# Patient Record
Sex: FEMALE | Race: BLACK OR AFRICAN AMERICAN | NOT HISPANIC OR LATINO | URBAN - METROPOLITAN AREA
[De-identification: names, ages, dates, MRNs, and addresses within clinical notes are randomized per-mention and may not be internally consistent; named-entity substitution may affect disease eponyms.]

---

## 2022-12-07 ENCOUNTER — TELEPHONE ENCOUNTER (OUTPATIENT)
Dept: URBAN - METROPOLITAN AREA CLINIC 113 | Facility: CLINIC | Age: 42
End: 2022-12-07

## 2022-12-28 ENCOUNTER — OFFICE VISIT (OUTPATIENT)
Dept: URBAN - METROPOLITAN AREA CLINIC 113 | Facility: CLINIC | Age: 42
End: 2022-12-28
Payer: COMMERCIAL

## 2022-12-28 VITALS
RESPIRATION RATE: 18 BRPM | SYSTOLIC BLOOD PRESSURE: 136 MMHG | BODY MASS INDEX: 31.83 KG/M2 | WEIGHT: 173 LBS | TEMPERATURE: 97.3 F | HEIGHT: 62 IN | HEART RATE: 77 BPM | DIASTOLIC BLOOD PRESSURE: 86 MMHG

## 2022-12-28 DIAGNOSIS — K21.9 GASTROESOPHAGEAL REFLUX DISEASE WITHOUT ESOPHAGITIS: ICD-10-CM

## 2022-12-28 DIAGNOSIS — K59.04 CHRONIC IDIOPATHIC CONSTIPATION: ICD-10-CM

## 2022-12-28 PROCEDURE — 99204 OFFICE O/P NEW MOD 45 MIN: CPT | Performed by: NURSE PRACTITIONER

## 2022-12-28 RX ORDER — ROSUVASTATIN CALCIUM 5 MG/1
1 TABLET TABLET, FILM COATED ORAL ONCE A DAY
Status: ACTIVE | COMMUNITY

## 2022-12-28 RX ORDER — METFORMIN HYDROCHLORIDE 500 MG/1
1 TABLET TABLET, FILM COATED ORAL TWICE DAILY
Status: ACTIVE | COMMUNITY

## 2022-12-28 RX ORDER — MULTIVITAMIN WITH IRON
AS DIRECTED TABLET ORAL
Status: ACTIVE | COMMUNITY

## 2022-12-28 RX ORDER — OMEPRAZOLE 40 MG/1
1 CAPSULE 30 MINUTES BEFORE MORNING MEAL CAPSULE, DELAYED RELEASE ORAL ONCE A DAY
Status: ACTIVE | COMMUNITY

## 2022-12-28 RX ORDER — OMEPRAZOLE 40 MG/1
1 CAPSULE 30 MINUTES BEFORE MORNING MEAL CAPSULE, DELAYED RELEASE ORAL ONCE A DAY
OUTPATIENT

## 2022-12-28 RX ORDER — LINACLOTIDE 72 UG/1
1 CAPSULE AT LEAST 30 MINUTES BEFORE THE FIRST MEAL OF THE DAY ON AN EMPTY STOMACH CAPSULE, GELATIN COATED ORAL ONCE A DAY
Qty: 90 | Refills: 3 | OUTPATIENT

## 2022-12-28 NOTE — HPI-TODAY'S VISIT:
42-year-old female referred by Dr. Deedee Luque as an urgent consult for chronic idiopathic constipation.  A copy of today's visit will be forwarded to the referring provider.  Referral notes are reviewed.  She has a history of chronic GERD and constipation despite treatment with pantoprazole 40 mg daily and Linzess. She reports a past medical history of type 2 diabetes telling me her most recent A1c was 6.8.  She also reports a history of hyperlipidemia for which she is taking Crestor.  She describes 2 prior surgeries required to remove ovarian cysts.  She denies any family history of liver disease, pancreas disease, colon cancer, stomach cancer.  She does report a maternal grandmother with polyps at the age of 80.  She denies any alcohol, tobacco or drug use.  She does use NSAIDs on rare occasion for headaches, less than 2 times per month.  She is not having any difficulty with swallowing.  She has heartburn that is typically diet dependent.  Seasonings and spaghetti sauce are typical triggers.  This has been ongoing for several years.  She reports good control with omeprazole 40 mg daily.  There is no nausea or vomiting.  She describes generalized abdominal pain and bloating associated with need for bowel movement.  There can sometimes be increased pain during a bowel movement.  She is having bowel movements once per week.  She was recently prescribed Linzess 72 mcg which she has taken a total of 3 times.  This was helpful, and resulted in 3 bowel movements in a day.

## 2023-01-27 ENCOUNTER — WEB ENCOUNTER (OUTPATIENT)
Dept: URBAN - METROPOLITAN AREA CLINIC 113 | Facility: CLINIC | Age: 43
End: 2023-01-27

## 2023-01-27 ENCOUNTER — OFFICE VISIT (OUTPATIENT)
Dept: URBAN - METROPOLITAN AREA CLINIC 113 | Facility: CLINIC | Age: 43
End: 2023-01-27
Payer: COMMERCIAL

## 2023-01-27 VITALS
SYSTOLIC BLOOD PRESSURE: 155 MMHG | RESPIRATION RATE: 20 BRPM | HEIGHT: 62 IN | TEMPERATURE: 97.5 F | HEART RATE: 76 BPM | WEIGHT: 177.6 LBS | BODY MASS INDEX: 32.68 KG/M2 | DIASTOLIC BLOOD PRESSURE: 98 MMHG

## 2023-01-27 DIAGNOSIS — K59.04 CHRONIC IDIOPATHIC CONSTIPATION: ICD-10-CM

## 2023-01-27 DIAGNOSIS — K21.9 GASTROESOPHAGEAL REFLUX DISEASE WITHOUT ESOPHAGITIS: ICD-10-CM

## 2023-01-27 PROBLEM — 82934008: Status: ACTIVE | Noted: 2022-12-28

## 2023-01-27 PROBLEM — 266435005: Status: ACTIVE | Noted: 2022-12-28

## 2023-01-27 PROCEDURE — 99213 OFFICE O/P EST LOW 20 MIN: CPT | Performed by: NURSE PRACTITIONER

## 2023-01-27 RX ORDER — MULTIVITAMIN WITH IRON
AS DIRECTED TABLET ORAL
Status: ACTIVE | COMMUNITY

## 2023-01-27 RX ORDER — LINACLOTIDE 72 UG/1
1 CAPSULE AT LEAST 30 MINUTES BEFORE THE FIRST MEAL OF THE DAY ON AN EMPTY STOMACH CAPSULE, GELATIN COATED ORAL ONCE A DAY
Qty: 90 | Refills: 3 | OUTPATIENT

## 2023-01-27 RX ORDER — OMEPRAZOLE 40 MG/1
1 CAPSULE 30 MINUTES BEFORE MORNING MEAL CAPSULE, DELAYED RELEASE ORAL ONCE A DAY
OUTPATIENT

## 2023-01-27 RX ORDER — METFORMIN HYDROCHLORIDE 500 MG/1
1 TABLET TABLET, FILM COATED ORAL TWICE DAILY
Status: ACTIVE | COMMUNITY

## 2023-01-27 RX ORDER — ROSUVASTATIN CALCIUM 5 MG/1
1 TABLET TABLET, FILM COATED ORAL ONCE A DAY
Status: ACTIVE | COMMUNITY

## 2023-01-27 RX ORDER — LINACLOTIDE 72 UG/1
1 CAPSULE AT LEAST 30 MINUTES BEFORE THE FIRST MEAL OF THE DAY ON AN EMPTY STOMACH CAPSULE, GELATIN COATED ORAL ONCE A DAY
Qty: 90 | Refills: 3 | Status: ACTIVE | COMMUNITY

## 2023-01-27 RX ORDER — OMEPRAZOLE 40 MG/1
1 CAPSULE 30 MINUTES BEFORE MORNING MEAL CAPSULE, DELAYED RELEASE ORAL ONCE A DAY
Status: ACTIVE | COMMUNITY

## 2023-01-27 NOTE — HPI-TODAY'S VISIT:
42-year-old female referred by Dr. Deedee Luque as an urgent consult for chronic idiopathic constipation.  A copy of today's visit will be forwarded to the referring provider.  Referral notes are reviewed.  She has a history of chronic GERD and constipation despite treatment with pantoprazole 40 mg daily and Linzess. She reports a past medical history of type 2 diabetes telling me her most recent A1c was 6.8.  She also reports a history of hyperlipidemia for which she is taking Crestor.  She describes 2 prior surgeries required to remove ovarian cysts.  She denies any family history of liver disease, pancreas disease, colon cancer, stomach cancer.  She does report a maternal grandmother with polyps at the age of 80.  She denies any alcohol, tobacco or drug use.  She does use NSAIDs on rare occasion for headaches, less than 2 times per month.  She is not having any difficulty with swallowing.  She has heartburn that is typically diet dependent.  Seasonings and spaghetti sauce are typical triggers.  This has been ongoing for several years.  She reports good control with omeprazole 40 mg daily.  There is no nausea or vomiting.  She describes generalized abdominal pain and bloating associated with need for bowel movement.  There can sometimes be increased pain during a bowel movement.  She is having bowel movements once per week.  She was recently prescribed Linzess 72 mcg which she has taken a total of 3 times.  This was helpful, and resulted in 3 bowel movements in a day.  Interval history, 1/27/23. Interval history, 1/27/23. She is feeling much better. She is having bowel movements daily using Linzess 72 mcg daily. She continues with bloating complaints. She has not yet modified her diet. No alarm features. Weight loss. Heartburn is controlled with omeprazole 40 mg daily.

## 2023-04-07 ENCOUNTER — OFFICE VISIT (OUTPATIENT)
Dept: URBAN - METROPOLITAN AREA CLINIC 113 | Facility: CLINIC | Age: 43
End: 2023-04-07

## 2023-04-28 ENCOUNTER — OFFICE VISIT (OUTPATIENT)
Dept: URBAN - METROPOLITAN AREA CLINIC 113 | Facility: CLINIC | Age: 43
End: 2023-04-28

## 2023-05-19 ENCOUNTER — OFFICE VISIT (OUTPATIENT)
Dept: URBAN - METROPOLITAN AREA CLINIC 113 | Facility: CLINIC | Age: 43
End: 2023-05-19

## 2023-06-16 ENCOUNTER — OFFICE VISIT (OUTPATIENT)
Dept: URBAN - METROPOLITAN AREA CLINIC 107 | Facility: CLINIC | Age: 43
End: 2023-06-16
Payer: COMMERCIAL

## 2023-06-16 ENCOUNTER — LAB OUTSIDE AN ENCOUNTER (OUTPATIENT)
Dept: URBAN - METROPOLITAN AREA CLINIC 107 | Facility: CLINIC | Age: 43
End: 2023-06-16

## 2023-06-16 VITALS
TEMPERATURE: 96.3 F | BODY MASS INDEX: 32.39 KG/M2 | DIASTOLIC BLOOD PRESSURE: 81 MMHG | RESPIRATION RATE: 18 BRPM | WEIGHT: 176 LBS | SYSTOLIC BLOOD PRESSURE: 143 MMHG | HEIGHT: 62 IN | HEART RATE: 79 BPM

## 2023-06-16 DIAGNOSIS — K21.9 GASTROESOPHAGEAL REFLUX DISEASE WITHOUT ESOPHAGITIS: ICD-10-CM

## 2023-06-16 DIAGNOSIS — R10.84 GENERALIZED ABDOMINAL PAIN: ICD-10-CM

## 2023-06-16 DIAGNOSIS — K59.04 CHRONIC IDIOPATHIC CONSTIPATION: ICD-10-CM

## 2023-06-16 PROCEDURE — 99214 OFFICE O/P EST MOD 30 MIN: CPT | Performed by: INTERNAL MEDICINE

## 2023-06-16 RX ORDER — LINACLOTIDE 72 UG/1
1 CAPSULE AT LEAST 30 MINUTES BEFORE THE FIRST MEAL OF THE DAY ON AN EMPTY STOMACH CAPSULE, GELATIN COATED ORAL ONCE A DAY
Qty: 90 | Refills: 3 | Status: ACTIVE | COMMUNITY

## 2023-06-16 RX ORDER — OMEPRAZOLE 40 MG/1
1 CAPSULE 30 MINUTES BEFORE MORNING MEAL CAPSULE, DELAYED RELEASE ORAL ONCE A DAY
Status: ON HOLD | COMMUNITY

## 2023-06-16 RX ORDER — METFORMIN HYDROCHLORIDE 500 MG/1
1 TABLET TABLET, FILM COATED ORAL TWICE DAILY
Status: ACTIVE | COMMUNITY

## 2023-06-16 RX ORDER — SEMAGLUTIDE 0.68 MG/ML
AS DIRECTED INJECTION, SOLUTION SUBCUTANEOUS
Status: ACTIVE | COMMUNITY

## 2023-06-16 RX ORDER — ROSUVASTATIN CALCIUM 5 MG/1
1 TABLET TABLET, FILM COATED ORAL ONCE A DAY
Status: ACTIVE | COMMUNITY

## 2023-06-16 RX ORDER — MULTIVITAMIN WITH IRON
AS DIRECTED TABLET ORAL
Status: ACTIVE | COMMUNITY

## 2023-06-16 NOTE — PHYSICAL EXAM GASTROINTESTINAL
Abdomen is soft, nontender, moderately distended , no rebound or guarding. No organomegaly appreciated

## 2023-06-16 NOTE — HPI-TODAY'S VISIT:
Patient returns today in follow-up.  She has a history of slow transit constipation or irritable bowel syndrome constipation predominant.  She has been on Linzess.  We gave her a higher dose samples to try but she prefers the 72 mcg dose.  She is having a bowel movement every day by taking this medication.  She is no longer taking omeprazole regularly.  She reports symptoms approximately once a week.  No swallowing difficulty. She is complaining of new problems of abdominal discomfort and distention.  Generalized.  Crampy in nature.  Some relief with defecation but not consistent.  No obvious other precipitants for this.

## 2024-01-22 ENCOUNTER — DASHBOARD ENCOUNTERS (OUTPATIENT)
Age: 44
End: 2024-01-22

## 2024-01-22 ENCOUNTER — OFFICE VISIT (OUTPATIENT)
Dept: URBAN - METROPOLITAN AREA CLINIC 107 | Facility: CLINIC | Age: 44
End: 2024-01-22
Payer: COMMERCIAL

## 2024-01-22 VITALS
HEIGHT: 62 IN | SYSTOLIC BLOOD PRESSURE: 128 MMHG | WEIGHT: 179 LBS | DIASTOLIC BLOOD PRESSURE: 81 MMHG | TEMPERATURE: 97.4 F | BODY MASS INDEX: 32.94 KG/M2 | HEART RATE: 79 BPM

## 2024-01-22 DIAGNOSIS — K21.9 GASTROESOPHAGEAL REFLUX DISEASE WITHOUT ESOPHAGITIS: ICD-10-CM

## 2024-01-22 DIAGNOSIS — K76.0 NAFLD (NONALCOHOLIC FATTY LIVER DISEASE): ICD-10-CM

## 2024-01-22 DIAGNOSIS — R10.84 GENERALIZED ABDOMINAL PAIN: ICD-10-CM

## 2024-01-22 DIAGNOSIS — K59.04 CHRONIC IDIOPATHIC CONSTIPATION: ICD-10-CM

## 2024-01-22 PROBLEM — 1231824009: Status: ACTIVE | Noted: 2024-01-22

## 2024-01-22 PROCEDURE — 99214 OFFICE O/P EST MOD 30 MIN: CPT | Performed by: INTERNAL MEDICINE

## 2024-01-22 RX ORDER — MULTIVITAMIN WITH IRON
AS DIRECTED TABLET ORAL
Status: ACTIVE | COMMUNITY

## 2024-01-22 RX ORDER — ROSUVASTATIN CALCIUM 5 MG/1
1 TABLET TABLET, FILM COATED ORAL ONCE A DAY
Status: ACTIVE | COMMUNITY

## 2024-01-22 RX ORDER — LINACLOTIDE 72 UG/1
1 CAPSULE AT LEAST 30 MINUTES BEFORE THE FIRST MEAL OF THE DAY ON AN EMPTY STOMACH CAPSULE, GELATIN COATED ORAL ONCE A DAY
Qty: 90 | Refills: 3 | Status: ACTIVE | COMMUNITY

## 2024-01-22 RX ORDER — LINACLOTIDE 72 UG/1
1 CAPSULE AT LEAST 30 MINUTES BEFORE THE FIRST MEAL OF THE DAY ON AN EMPTY STOMACH CAPSULE, GELATIN COATED ORAL ONCE A DAY
OUTPATIENT

## 2024-01-22 RX ORDER — OMEPRAZOLE 40 MG/1
1 CAPSULE 30 MINUTES BEFORE MORNING MEAL CAPSULE, DELAYED RELEASE ORAL ONCE A DAY
Status: ON HOLD | COMMUNITY

## 2024-01-22 RX ORDER — SEMAGLUTIDE 0.68 MG/ML
AS DIRECTED INJECTION, SOLUTION SUBCUTANEOUS
Status: ON HOLD | COMMUNITY

## 2024-01-22 RX ORDER — METFORMIN HYDROCHLORIDE 500 MG/1
1 TABLET TABLET, FILM COATED ORAL TWICE DAILY
Status: ACTIVE | COMMUNITY

## 2024-01-22 NOTE — HPI-TODAY'S VISIT:
Patient returns today in follow-up.  She has a history of chronic idiopathic constipation as well as generalized abdominal pain.  She has been doing better on Linzess.  She takes it almost every day.  She is having more regular bowel movements which has been helpful.  Still has some pain.  Concerned about her weight.  I reviewed with her her CT scan.  This showed fatty liver as well as hepatic angioma.  She had some ovarian cystic changes which she states she was already aware of and is followed by her GYN.  Most recent labs were also reviewed with her.  She had normal CBC as well as an AST 64 ALT of 86 otherwise normal study.  She denies any jaundice or dark urine.  She acknowledges little exercise.

## 2024-08-23 ENCOUNTER — LAB OUTSIDE AN ENCOUNTER (OUTPATIENT)
Dept: URBAN - METROPOLITAN AREA CLINIC 113 | Facility: CLINIC | Age: 44
End: 2024-08-23

## 2024-08-23 ENCOUNTER — OFFICE VISIT (OUTPATIENT)
Dept: URBAN - METROPOLITAN AREA CLINIC 113 | Facility: CLINIC | Age: 44
End: 2024-08-23
Payer: COMMERCIAL

## 2024-08-23 ENCOUNTER — TELEPHONE ENCOUNTER (OUTPATIENT)
Dept: URBAN - METROPOLITAN AREA CLINIC 113 | Facility: CLINIC | Age: 44
End: 2024-08-23

## 2024-08-23 VITALS
RESPIRATION RATE: 14 BRPM | WEIGHT: 183.2 LBS | BODY MASS INDEX: 33.71 KG/M2 | SYSTOLIC BLOOD PRESSURE: 139 MMHG | HEART RATE: 74 BPM | HEIGHT: 62 IN | DIASTOLIC BLOOD PRESSURE: 93 MMHG | TEMPERATURE: 97.2 F

## 2024-08-23 DIAGNOSIS — K76.0 NAFLD (NONALCOHOLIC FATTY LIVER DISEASE): ICD-10-CM

## 2024-08-23 DIAGNOSIS — K59.04 CHRONIC IDIOPATHIC CONSTIPATION: ICD-10-CM

## 2024-08-23 DIAGNOSIS — Z12.11 COLON CANCER SCREENING: ICD-10-CM

## 2024-08-23 DIAGNOSIS — K21.9 GASTROESOPHAGEAL REFLUX DISEASE WITHOUT ESOPHAGITIS: ICD-10-CM

## 2024-08-23 PROCEDURE — 99214 OFFICE O/P EST MOD 30 MIN: CPT | Performed by: INTERNAL MEDICINE

## 2024-08-23 RX ORDER — SEMAGLUTIDE 0.68 MG/ML
AS DIRECTED INJECTION, SOLUTION SUBCUTANEOUS
Status: ON HOLD | COMMUNITY

## 2024-08-23 RX ORDER — OMEPRAZOLE 40 MG/1
1 CAPSULE 30 MINUTES BEFORE MORNING MEAL CAPSULE, DELAYED RELEASE ORAL ONCE A DAY
Qty: 90 | Refills: 3 | OUTPATIENT
Start: 2024-08-23

## 2024-08-23 RX ORDER — LINACLOTIDE 72 UG/1
1 CAPSULE AT LEAST 30 MINUTES BEFORE THE FIRST MEAL OF THE DAY ON AN EMPTY STOMACH CAPSULE, GELATIN COATED ORAL ONCE A DAY
Qty: 90 | Refills: 3 | Status: ACTIVE | COMMUNITY

## 2024-08-23 RX ORDER — ROSUVASTATIN CALCIUM 5 MG/1
1 TABLET TABLET, FILM COATED ORAL ONCE A DAY
Status: ACTIVE | COMMUNITY

## 2024-08-23 RX ORDER — LINACLOTIDE 72 UG/1
1 CAPSULE AT LEAST 30 MINUTES BEFORE THE FIRST MEAL OF THE DAY ON AN EMPTY STOMACH CAPSULE, GELATIN COATED ORAL ONCE A DAY
Qty: 90 | Refills: 0
End: 2024-11-21

## 2024-08-23 RX ORDER — MULTIVITAMIN WITH IRON
AS DIRECTED TABLET ORAL
Status: ACTIVE | COMMUNITY

## 2024-08-23 RX ORDER — METFORMIN HYDROCHLORIDE 500 MG/1
1 TABLET TABLET, FILM COATED ORAL TWICE DAILY
Status: ACTIVE | COMMUNITY

## 2024-08-23 RX ORDER — OMEPRAZOLE 40 MG/1
1 CAPSULE 30 MINUTES BEFORE MORNING MEAL CAPSULE, DELAYED RELEASE ORAL ONCE A DAY
Status: ON HOLD | COMMUNITY

## 2024-08-23 NOTE — HPI-TODAY'S VISIT:
Patient returns today in follow-up.  Multiple GI issues.  She has history of chronic constipation which is better on Linzess.  She ran out which is decreased her bowel frequency.  She does quite well on it with a bowel movement generally every day.  Sometimes twice a day.  No bleeding.  She is having more reflux symptoms.  Previously taking omeprazole but has been off it.  Describes epigastric burning with radiation into her chest with some regurgitation.  No swallowing difficulty.  No unusual weight loss. She also has a history of elevated liver test related to nonalcoholic fatty liver disease.  She has not had any recent weight loss.  She denies jaundice or dark urine. Most recent labs were reviewed with her from July which showed a normal CBC, AST 51, ALT 54, hemoglobin A1c of 8.1 and a TSH is 2.74.

## 2024-09-04 ENCOUNTER — OFFICE VISIT (OUTPATIENT)
Dept: URBAN - METROPOLITAN AREA MEDICAL CENTER 19 | Facility: MEDICAL CENTER | Age: 44
End: 2024-09-04

## 2024-09-13 ENCOUNTER — OFFICE VISIT (OUTPATIENT)
Dept: URBAN - METROPOLITAN AREA MEDICAL CENTER 19 | Facility: MEDICAL CENTER | Age: 44
End: 2024-09-13
Payer: COMMERCIAL

## 2024-09-13 DIAGNOSIS — K63.5 BENIGN COLON POLYP: ICD-10-CM

## 2024-09-13 DIAGNOSIS — Z12.11 COLON CANCER SCREENING: ICD-10-CM

## 2024-09-13 PROCEDURE — 45385 COLONOSCOPY W/LESION REMOVAL: CPT | Performed by: INTERNAL MEDICINE

## 2024-09-13 RX ORDER — ROSUVASTATIN CALCIUM 5 MG/1
1 TABLET TABLET, FILM COATED ORAL ONCE A DAY
Status: ACTIVE | COMMUNITY

## 2024-09-13 RX ORDER — METFORMIN HYDROCHLORIDE 500 MG/1
1 TABLET TABLET, FILM COATED ORAL TWICE DAILY
Status: ACTIVE | COMMUNITY

## 2024-09-13 RX ORDER — SEMAGLUTIDE 0.68 MG/ML
AS DIRECTED INJECTION, SOLUTION SUBCUTANEOUS
Status: ON HOLD | COMMUNITY

## 2024-09-13 RX ORDER — OMEPRAZOLE 40 MG/1
1 CAPSULE 30 MINUTES BEFORE MORNING MEAL CAPSULE, DELAYED RELEASE ORAL ONCE A DAY
Status: ON HOLD | COMMUNITY

## 2024-09-13 RX ORDER — LINACLOTIDE 72 UG/1
1 CAPSULE AT LEAST 30 MINUTES BEFORE THE FIRST MEAL OF THE DAY ON AN EMPTY STOMACH CAPSULE, GELATIN COATED ORAL ONCE A DAY
Qty: 90 | Refills: 0 | Status: ACTIVE | COMMUNITY
End: 2024-11-21

## 2024-09-13 RX ORDER — MULTIVITAMIN WITH IRON
AS DIRECTED TABLET ORAL
Status: ACTIVE | COMMUNITY

## 2024-09-13 RX ORDER — OMEPRAZOLE 40 MG/1
1 CAPSULE 30 MINUTES BEFORE MORNING MEAL CAPSULE, DELAYED RELEASE ORAL ONCE A DAY
Qty: 90 | Refills: 3 | Status: ACTIVE | COMMUNITY
Start: 2024-08-23

## 2024-09-20 ENCOUNTER — TELEPHONE ENCOUNTER (OUTPATIENT)
Dept: URBAN - METROPOLITAN AREA CLINIC 113 | Facility: CLINIC | Age: 44
End: 2024-09-20

## 2025-03-25 ENCOUNTER — OFFICE VISIT (OUTPATIENT)
Dept: URBAN - METROPOLITAN AREA CLINIC 107 | Facility: CLINIC | Age: 45
End: 2025-03-25
Payer: COMMERCIAL

## 2025-03-25 DIAGNOSIS — K59.04 CHRONIC IDIOPATHIC CONSTIPATION: ICD-10-CM

## 2025-03-25 DIAGNOSIS — K21.9 GASTROESOPHAGEAL REFLUX DISEASE WITHOUT ESOPHAGITIS: ICD-10-CM

## 2025-03-25 DIAGNOSIS — R10.84 GENERALIZED ABDOMINAL PAIN: ICD-10-CM

## 2025-03-25 PROCEDURE — 99214 OFFICE O/P EST MOD 30 MIN: CPT | Performed by: INTERNAL MEDICINE

## 2025-03-25 RX ORDER — METFORMIN HYDROCHLORIDE 500 MG/1
1 TABLET TABLET, FILM COATED ORAL TWICE DAILY
Status: ACTIVE | COMMUNITY

## 2025-03-25 RX ORDER — OMEPRAZOLE 40 MG/1
1 CAPSULE 30 MINUTES BEFORE MORNING MEAL CAPSULE, DELAYED RELEASE ORAL ONCE A DAY
Status: DISCONTINUED | COMMUNITY

## 2025-03-25 RX ORDER — LINACLOTIDE 72 UG/1
1 CAPSULE AT LEAST 30 MINUTES BEFORE THE FIRST MEAL OF THE DAY ON AN EMPTY STOMACH CAPSULE, GELATIN COATED ORAL ONCE A DAY
Qty: 90 | Refills: 0
Start: 2025-03-25 | End: 2025-06-23

## 2025-03-25 RX ORDER — SEMAGLUTIDE 0.68 MG/ML
AS DIRECTED INJECTION, SOLUTION SUBCUTANEOUS
Status: DISCONTINUED | COMMUNITY

## 2025-03-25 RX ORDER — OMEPRAZOLE 40 MG/1
1 CAPSULE 30 MINUTES BEFORE MORNING MEAL CAPSULE, DELAYED RELEASE ORAL ONCE A DAY
Qty: 90 | Refills: 0
Start: 2024-08-23

## 2025-03-25 RX ORDER — HYOSCYAMINE SULFATE 0.12 MG/1
1 TABLET UNDER THE TONGUE AND ALLOW TO DISSOLVE TABLET, ORALLY DISINTEGRATING ORAL
Qty: 40 | Refills: 3 | OUTPATIENT
Start: 2025-03-25

## 2025-03-25 RX ORDER — OMEPRAZOLE 40 MG/1
1 CAPSULE 30 MINUTES BEFORE MORNING MEAL CAPSULE, DELAYED RELEASE ORAL ONCE A DAY
Qty: 90 | Refills: 3 | Status: ACTIVE | COMMUNITY
Start: 2024-08-23

## 2025-03-25 RX ORDER — LINACLOTIDE 72 UG/1
1 CAPSULE AT LEAST 30 MINUTES BEFORE THE FIRST MEAL OF THE DAY ON AN EMPTY STOMACH CAPSULE, GELATIN COATED ORAL ONCE A DAY
Status: ACTIVE | COMMUNITY
Start: 2025-03-25

## 2025-03-25 RX ORDER — ROSUVASTATIN CALCIUM 5 MG/1
1 TABLET TABLET, FILM COATED ORAL ONCE A DAY
Status: ACTIVE | COMMUNITY

## 2025-03-25 RX ORDER — MULTIVITAMIN WITH IRON
AS DIRECTED TABLET ORAL
Status: ACTIVE | COMMUNITY

## 2025-03-25 RX ORDER — TIRZEPATIDE 5 MG/.5ML
AS DIRECTED INJECTION, SOLUTION SUBCUTANEOUS DAILY
Status: ACTIVE | COMMUNITY

## 2025-03-25 NOTE — HPI-TODAY'S VISIT:
Patient presents today in follow-up.  She reports that she continues have some constipation but on further questioning she sometimes misses a day.  She is not going more than 2 or 3 days without a bowel movement.  She is taking Linzess every day.  Her reflux is better controlled with omeprazole.  Denies any swallowing difficulty.  No blood in the stool.  No melena.  She complains of some pain which has been generalized although a little more on the left side recently.  Feels like it is a gas bubble trapping.  She had a colonoscopy in September which showed a few hyperplastic polyps.  She had labs in December which showed an ALT of 42 otherwise normal LFTs.  Creatinine was normal.  Lipase was normal.

## 2025-07-30 ENCOUNTER — TELEPHONE ENCOUNTER (OUTPATIENT)
Dept: URBAN - METROPOLITAN AREA CLINIC 113 | Facility: CLINIC | Age: 45
End: 2025-07-30

## 2025-07-30 RX ORDER — LINACLOTIDE 72 UG/1
1 CAPSULE AT LEAST 30 MINUTES BEFORE THE FIRST MEAL OF THE DAY ON AN EMPTY STOMACH CAPSULE, GELATIN COATED ORAL ONCE A DAY
Qty: 90 | Refills: 3
Start: 2025-03-25 | End: 2026-07-25

## 2025-08-01 ENCOUNTER — P2P PATIENT RECORD (OUTPATIENT)
Age: 45
End: 2025-08-01

## 2025-08-04 ENCOUNTER — P2P PATIENT RECORD (OUTPATIENT)
Age: 45
End: 2025-08-04